# Patient Record
(demographics unavailable — no encounter records)

---

## 2025-04-30 NOTE — PHYSICAL EXAM
[Right] : right elbow [de-identified] :  Constitutional:  The patient appears well developed, well nourished.  Examination of patients ability to communicate functionally was normal.       Neurologic: Coordination is normal.  Alert and oriented to time, place and person.  No evidence of mood disorder, calm affect.           RIGHT  ELBOW: Inspection of the elbow/arm is as follows: no swelling, no ecchymosis, no erythema, no deformity, no lacerations/abrasions, no rashes, no masses and no atrophy       Palpation of the elbow/arm is as follows: Tenderness over lateral epicondyle.       Range of Motion of the elbow/arm is as follows: Pain with flexion and extension.    Elbow motion in degrees:     Extension:  0    Flexion:  140    Supination: 90    Pronation:  90       Strength examination of the elbow/arm is as follows:    flexion strength 5/5   extension strength 5/5   pronation strength 5/5    supination strength 5/5       Special Testing of the elbow/arm is as follows: Lateral elbow pain with resisted wrist extension. Lateral elbow pain with resisted forearm supination. No varus or valgus instability in various degrees of elbow flexion and extension       Neurological testing of the elbow/arm is as follows: motor exam 5/5 and light touch intact.      [FreeTextEntry1] : ap/lat/oblique show minimal medial epicondylar and olecranon spurring. no frx noted

## 2025-04-30 NOTE — HISTORY OF PRESENT ILLNESS
[de-identified] : 04/30/2025  EULOGIO MENDOZA 41 year y/o M presents today for right elbow pain.  He notes pain lateral elbow worse with grasping objections.  Denies any paresthesias.  Patient has hx of B/L CTR   Date of Onset: About 1 month Mechanism of injury: NKI   Pain:    At Rest: 3/10    With Activity: 9/10   Have you been treated for this in the past? No OTC/Rx Medication: Ibuprofen PRN with no relief Heat, Ice, Elevation: No Previous Imaging/Studies: No

## 2025-04-30 NOTE — DISCUSSION/SUMMARY
[de-identified] :  EULOGIO MENDOZA 41 year M was seen and evaluated in office today. Following evaluation, the natural history of the pathology was explained in full to the patient in layman's terms.   Several different treatment options were discussed and explained in full to the patient, along with specific risks and benefits of both surgical and non-surgical treatments. Nonsurgical options including but not limited to Corticosteroid Injection, Prescription anti-inflammatory medications (both steroidal and non-steroidal), activity modification, maintaining a healthy BMI, bracing, and icing were all reviewed.   Discussed risk of morbidity associated with proposed treatment plans. These risks include, but are not limited to, the risk associated with prescription strength medications and possible side effects of these medications which include GI hemorrhage with oral medications along with cardiac/renal issues with long term use.    Furthermore, discussed with EULOGIO that they could also delay any immediate treatment options and continue to observe and self-care for the discussed problem. Discussed Home Exercise Programs as well as Rest, Ice and elevation. Patient had ample time to ask any questions about todays visit and the diagnosis, and all questions were answered. Patient understands the plan going forward.  Patient was given a CSI to the RIGHT LATERAL EPICONDYLE advised to ice if sore and will observe over the next 24 hours for any redness, swelling or increased discomfort. The indication for this procedure was pain and inflammation. The site was prepped with betadine and sterile technique used. An injection of Lidocaine 5cc of 1% was used Betamethasone (Celestone) 1cc of 6mg.  The patient tolerated procedure well. They were advised to call if redness, pain or fever occur and apply ice for 15 minutes out of every hour for the next 12-24 hours as tolerated. The risks benefits, and alternatives have been discussed. These risks include infection, hemarthrosis, allergic reaction, bleeding and hyperglycemia. Verbal understanding and consent was obtained. There is a moderate risk of morbidity with further treatment, especially from use of prescription strength medication. The patient f/u in 1 month.